# Patient Record
(demographics unavailable — no encounter records)

---

## 2024-12-04 NOTE — HISTORY OF PRESENT ILLNESS
[FreeTextEntry1] : 75 y/o M with a PMHx of HTN, HLD, DMII, Hx of allergies,  b/l LE DVT on Eliquis. He was admitted to [facility] from 6/1 to 6/30 for a new onset of heart failure. LVEF of 25%, s/p Cardiac catheterization with multi-vessel CAD. S/P CABG on 6/15/23, hospitalization was prolonged due to hypotension and post-operative anemia s/p blood transfusion. He is here for a follow-up.    Mr. Dudley reports being doing well, he reports no SOB, chest pain. He is attending going to rehab. We tried going down on torsemide to 10 mg but he started gaining weight.  06/05/2024: Patient feels well, can walk several blocks with no limitations. He reports occasional dizziness when getting up. SBP in 90s- lower 100s. Weight has been stable. The patient denies experiencing chest pain, bleeding, shortness of breath at rest, paroxysmal nocturnal dyspnea (PND), abdominal discomfort, bilateral lower extremity edema, palpitations, and syncope. He takes his medications as indicated.  12/04/2024: Patient is here for F/U visit on management of HFrEF. Reports no changes in his symptoms or ET, can walk a few blocks without dyspnea or 1 flight of stairs. The patient denies experiencing chest pain, bleeding, shortness of breath at rest, paroxysmal nocturnal dyspnea (PND), abdominal discomfort, lightheadedness/dizziness, bilateral lower extremity edema, palpitations, and syncope. His weight has been stable on Torsemide 20 mg daily 220-222 lbs. BP is well controlled 100/70 on average. Patient follows low sodium diet. Patient had TTE with Dr. Odonnell in 03/2024 which showed an improvement of EF to 25-30% and MR from severe to mild.   TTE 06/20/23: EF <20%, DD grade II, mod to severe MR

## 2024-12-04 NOTE — PHYSICAL EXAM
[Well Developed] : well developed [Well Nourished] : well nourished [Normal] : no edema, no cyanosis, no clubbing, no varicosities

## 2024-12-04 NOTE — ASSESSMENT
[FreeTextEntry1] : 75 y/o M with h/o CAD s/p CABG, NICM, chronic systolic HF, LVEF improved from <20% to 25-30% and MR severe to mild. He is coming for follow up. He can walk around one blood without significant limitation. 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    HFrEF/CAD/CABG/Anemia   Close to euvolemic on exam. 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 TTE 25-30% with gen cardiologist on 3/2024 Continue torsemide 20 mg daily, take extra as needed for weight gain of 2 lbs  Continue Jardiance 25 mg daily Continue Entresto 24/26 mg BID Continue Metoprolol Succinate ER 50 MG daily Tried spironolactone before but discontinued it due to dizziness/lightheadedness f/u w/jossie for cards Jorge A for EP Blood work in one month F/U in 6 months   Umang Reese MD, FACC, FSA  Advanced Heart Failure/ Mechanical Circulatory Support Pulmonary Hypertension and Cardiac Amyloidosis  [facility]

## 2025-01-16 NOTE — PHYSICAL EXAM
[General Appearance - Well Developed] : well developed [General Appearance - Well Nourished] : well nourished [Normal Appearance] : normal appearance [Well Groomed] : well groomed [General Appearance - In No Acute Distress] : no acute distress [] : no respiratory distress [Respiration, Rhythm And Depth] : normal respiratory rhythm and effort [Exaggerated Use Of Accessory Muscles For Inspiration] : no accessory muscle use [Abdomen Soft] : soft [Abdomen Tenderness] : non-tender [Costovertebral Angle Tenderness] : no ~M costovertebral angle tenderness [Urinary Bladder Findings] : the bladder was normal on palpation [Normal Station and Gait] : the gait and station were normal for the patient's age [No Focal Deficits] : no focal deficits [Oriented To Time, Place, And Person] : oriented to person, place, and time [Affect] : the affect was normal [Mood] : the mood was normal [No Palpable Adenopathy] : no palpable adenopathy [Urethral Meatus] : meatus normal [Penis Abnormality] : normal uncircumcised penis [Scrotum] : the scrotum was normal [Rectal Exam - Seminal Vesicles] : the seminal vesicles were normal [Epididymis] : the epididymides were normal [Testes Tenderness] : no tenderness of the testes [Testes Mass (___cm)] : there were no testicular masses

## 2025-01-31 NOTE — ASSESSMENT
[FreeTextEntry1] : His balanitis is improving he will continue clotrimazole and Betadine washes.    We reviewed his [place] criteria and his intermediate risk with METs greater than 4.  He was cautioned that he is of increased risk but sex with a known partner should be acceptable.  His decision was he would let his partner do the work and he knows that if he starts having any signs or symptoms be concerning he will stop.  As well, he will start sildenafil 20 mg taking up to 3 tabs 1 hour prior to intercourse.  All usage, dosage, mechanism of action, and adverse events fully reviewed.  He knows not to take more than 3 tabs, or 60 mg, in a 24-hour period.    His testosterone was borderline low and we will obtain repeat hormone panel however,  based on his cardiac history, I am not rushing to give him testosterone.

## 2025-01-31 NOTE — HISTORY OF PRESENT ILLNESS
[Erectile Dysfunction] : Erectile Dysfunction [None] : None [FreeTextEntry1] : Vish is a 74-year-old male, born July 20, 1950, with history of type 2 diabetes, DVT, CAD/CHF status post CABG x 4 in June 2023, who we have been following for erectile dysfunction and balanitis.  He presents today to review his [place] criteria and hormone panel.  He has been using balanitis care with significant proved in his balanitis.  He was just using the Betadine as he only got the clotrimazole this weekend however, He tells me even then it has significantly improved.  Blood work from 1/22/25: CBC demonstrates mild anemia at 12.3 Liver function testing demonstrates an alkaline phosphatase of 25 and well Total testosterone 114.8 Bioavailable 37.3 PSA 1.6 Hemoglobin A1c 7.2 LH 36.4 Prolactin 18.3 Estradiol 15.8 SHBG 59.4 [place] criteria demonstrates METs greater than 4, intermediate risk.    [Urinary Incontinence] : no urinary incontinence [Urinary Retention] : no urinary retention [Urinary Urgency] : no urinary urgency [Urinary Frequency] : no urinary frequency [Nocturia] : no nocturia [Straining] : no straining [Weak Stream] : no weak stream [Intermittency] : no intermittency [Post-Void Dribbling] : no post-void dribbling [Dysuria] : no dysuria

## 2025-01-31 NOTE — LETTER BODY
[Consult Letter:] : I had the pleasure of evaluating your patient, [unfilled]. [Please see my note below.] : Please see my note below. [Consult Closing:] : Thank you very much for allowing me to participate in the care of this patient.  If you have any questions, please do not hesitate to contact me. [Sincerely,] : Sincerely, [FreeTextEntry2] : Geovany Cotto MD

## 2025-01-31 NOTE — ASSESSMENT
[FreeTextEntry1] : His balanitis is improving he will continue clotrimazole and Betadine washes.    We reviewed his [place] criteria and his intermediate risk with METs greater than 4.  He was cautioned that he is of increased risk but sex with a known partner should be acceptable.  His decision was he would let his partner do the work and he knows that if he starts having any signs or symptoms be concerning he will stop.  As well, he will start sildenafil 20 mg taking up to 3 tabs 1 hour prior to intercourse.  All usage, dosage, mechanism of action, and adverse events fully reviewed.  He knows not to take more than 3 tabs, or 60 mg, in a 24-hour period.    His testosterone was borderline low and we will obtain repeat hormone panel however,  based on his cardiac history, I am not rushing to give him testosterone.

## 2025-01-31 NOTE — END OF VISIT
[FreeTextEntry3] : I, Dr. Villegas, personally performed the evaluation and management (E/M) services for this established patient who presents today with (a) new problem(s)/exacerbation of (an) existing condition(s).  That E/M includes conducting the examination, assessing all new/exacerbated conditions, and establishing a new plan of care.  Today, my ACP, Be Singh was here to observe my evaluation and management services for this new problem/exacerbated condition to be followed going forward.

## 2025-01-31 NOTE — LETTER HEADER
[FreeTextEntry3] : Jose L Villegas MD [facility], [address] [place] [address]

## 2025-01-31 NOTE — LETTER HEADER
[FreeTextEntry3] : Jose L Villegas MD [facility], [address] [place] [address]

## 2025-01-31 NOTE — PHYSICAL EXAM
[General Appearance - Well Developed] : well developed [General Appearance - Well Nourished] : well nourished [Normal Appearance] : normal appearance [Well Groomed] : well groomed [General Appearance - In No Acute Distress] : no acute distress [Edema] : no peripheral edema [Respiration, Rhythm And Depth] : normal respiratory rhythm and effort [Abdomen Soft] : soft [Exaggerated Use Of Accessory Muscles For Inspiration] : no accessory muscle use [Abdomen Tenderness] : non-tender [Costovertebral Angle Tenderness] : no ~M costovertebral angle tenderness [Urethral Meatus] : meatus normal [Penis Abnormality] : normal uncircumcised penis [Urinary Bladder Findings] : the bladder was normal on palpation [Scrotum] : the scrotum was normal [Rectal Exam - Seminal Vesicles] : the seminal vesicles were normal [Epididymis] : the epididymides were normal [Testes Tenderness] : no tenderness of the testes [Normal Station and Gait] : the gait and station were normal for the patient's age [] : no rash [No Focal Deficits] : no focal deficits [Oriented To Time, Place, And Person] : oriented to person, place, and time [Affect] : the affect was normal [Mood] : the mood was normal [Not Anxious] : not anxious [Chaperone Present] : A chaperone was present in the examining room during all aspects of the physical examination [de-identified] : Improved balanitis but still persistent [FreeTextEntry2] : Be Singh PA-C

## 2025-01-31 NOTE — HISTORY OF PRESENT ILLNESS
[Erectile Dysfunction] : Erectile Dysfunction [None] : None [FreeTextEntry1] : Vish is a 74-year-old male, born July 20, 1950, with history of type 2 diabetes, DVT, CAD/CHF status post CABG x 4 in June 2023, who we have been following for erectile dysfunction and balanitis.  He presents today to review his [place] criteria and hormone panel.  He has been using balanitis care with significant proved in his balanitis.  He was just using the Betadine as he only got the clotrimazole this weekend however, He tells me even then it has significantly improved.  Blood work from 1/22/25: CBC demonstrates mild anemia at 12.3 Liver function testing demonstrates an alkaline phosphatase of 25 and well Total testosterone 114.8 Bioavailable 37.3 PSA 1.6 Hemoglobin A1c 7.2 LH 36.4 Prolactin 18.3 Estradiol 15.8 SHBG 59.4 [place] criteria demonstrates METs greater than 4, intermediate risk.    [Urinary Incontinence] : no urinary incontinence [Urinary Retention] : no urinary retention [Urinary Urgency] : no urinary urgency [Urinary Frequency] : no urinary frequency [Nocturia] : no nocturia [Straining] : no straining [Weak Stream] : no weak stream [Intermittency] : no intermittency [Post-Void Dribbling] : no post-void dribbling [Dysuria] : no dysuria

## 2025-01-31 NOTE — PHYSICAL EXAM
[General Appearance - Well Developed] : well developed [General Appearance - Well Nourished] : well nourished [Normal Appearance] : normal appearance [Well Groomed] : well groomed [General Appearance - In No Acute Distress] : no acute distress [Edema] : no peripheral edema [Respiration, Rhythm And Depth] : normal respiratory rhythm and effort [Abdomen Soft] : soft [Exaggerated Use Of Accessory Muscles For Inspiration] : no accessory muscle use [Abdomen Tenderness] : non-tender [Costovertebral Angle Tenderness] : no ~M costovertebral angle tenderness [Urethral Meatus] : meatus normal [Penis Abnormality] : normal uncircumcised penis [Urinary Bladder Findings] : the bladder was normal on palpation [Scrotum] : the scrotum was normal [Rectal Exam - Seminal Vesicles] : the seminal vesicles were normal [Epididymis] : the epididymides were normal [Testes Tenderness] : no tenderness of the testes [Normal Station and Gait] : the gait and station were normal for the patient's age [] : no rash [No Focal Deficits] : no focal deficits [Oriented To Time, Place, And Person] : oriented to person, place, and time [Affect] : the affect was normal [Not Anxious] : not anxious [Mood] : the mood was normal [Chaperone Present] : A chaperone was present in the examining room during all aspects of the physical examination [de-identified] : Improved balanitis but still persistent [FreeTextEntry2] : Be Singh PA-C

## 2025-01-31 NOTE — ADDENDUM
[FreeTextEntry1] :   Longitudinal care (CPT code ): - The patient is diagnosed with hypogonadism , which is a chronic condition due to lifestyle choices or metabolic dysregulation, and requires longitudinal care to prevent disease progression and systemic complications.  Longitudinal care (CPT code ): - The patient is diagnosed with erectile dysfunction , which is a chronic condition due to lifestyle choices or metabolic dysregulation, and requires longitudinal care to prevent disease progression and systemic complications.